# Patient Record
Sex: FEMALE | Race: WHITE | URBAN - METROPOLITAN AREA
[De-identification: names, ages, dates, MRNs, and addresses within clinical notes are randomized per-mention and may not be internally consistent; named-entity substitution may affect disease eponyms.]

---

## 2023-06-20 ENCOUNTER — OFFICE VISIT (OUTPATIENT)
Age: 23
End: 2023-06-20
Payer: COMMERCIAL

## 2023-06-20 VITALS
SYSTOLIC BLOOD PRESSURE: 120 MMHG | BODY MASS INDEX: 28.85 KG/M2 | RESPIRATION RATE: 17 BRPM | HEIGHT: 61 IN | OXYGEN SATURATION: 100 % | WEIGHT: 152.8 LBS | HEART RATE: 99 BPM | DIASTOLIC BLOOD PRESSURE: 80 MMHG

## 2023-06-20 DIAGNOSIS — T48.5X5A RHINITIS MEDICAMENTOSA: ICD-10-CM

## 2023-06-20 DIAGNOSIS — J34.3 HYPERTROPHY OF BOTH INFERIOR NASAL TURBINATES: ICD-10-CM

## 2023-06-20 DIAGNOSIS — R09.81 CHRONIC NASAL CONGESTION: Primary | ICD-10-CM

## 2023-06-20 DIAGNOSIS — J31.0 RHINITIS MEDICAMENTOSA: ICD-10-CM

## 2023-06-20 PROCEDURE — 99203 OFFICE O/P NEW LOW 30 MIN: CPT | Performed by: OTOLARYNGOLOGY

## 2023-06-20 RX ORDER — LEVONORGESTREL AND ETHINYL ESTRADIOL 0.1-0.02MG
1 KIT ORAL DAILY
COMMUNITY
Start: 2023-04-16

## 2023-06-20 RX ORDER — FLUTICASONE PROPIONATE 50 MCG
1 SPRAY, SUSPENSION (ML) NASAL DAILY
Qty: 32 G | Refills: 1 | Status: SHIPPED | OUTPATIENT
Start: 2023-06-20

## 2023-06-20 ASSESSMENT — ENCOUNTER SYMPTOMS
WHEEZING: 0
NAUSEA: 0
SINUS PRESSURE: 0
VOMITING: 0
VOICE CHANGE: 0
CHOKING: 0
PHOTOPHOBIA: 0
STRIDOR: 0
COUGH: 0
BACK PAIN: 0
APNEA: 0
SINUS PAIN: 0
TROUBLE SWALLOWING: 0
EYE ITCHING: 0
ABDOMINAL PAIN: 0
SHORTNESS OF BREATH: 0
SORE THROAT: 0
EYE DISCHARGE: 0

## 2023-06-20 NOTE — PROGRESS NOTES
Vanessa Rivas is a 20 y.o. female here for   Chief Complaint   Patient presents with    New Patient     Inflamed turbinates        There were no vitals taken for this visit.
Hematological:  Negative for adenopathy. Does not bruise/bleed easily. Psychiatric/Behavioral:  Negative for confusion and sleep disturbance. The patient is not nervous/anxious. No past medical history on file. No past surgical history on file. No family history on file. Social History     Tobacco Use    Smoking status: Not on file    Smokeless tobacco: Not on file   Substance Use Topics    Alcohol use: Not on file      Prior to Admission medications    Medication Sig Start Date End Date Taking? Authorizing Provider   [address] [place] [address] 0.1-20 MG-MCG per tablet Take 1 tablet by mouth daily 4/16/23  Yes Historical Provider, MD   Oxymetazoline HCl (VICKS SINEX 12 HOUR NA) by Nasal route   Yes Historical Provider, MD   fluticasone (FLONASE) 50 MCG/ACT nasal spray 1 spray by Each Nostril route daily 6/20/23  Yes Karine Mejia MD        Allergies   Allergen Reactions    Lidocaine      Bleeding at injection site    Tramadol Other (See Comments)     \"Bleeding\"    Dexamethasone Rash         Objective:     /80   Pulse 99   Resp 17   Ht 5' 1\" (1.549 m)   Wt 152 lb 12.8 oz (69.3 kg)   SpO2 100%   BMI 28.87 kg/m²      ENT Physical Exam  Constitutional  Appearance: patient appears well-developed, well-nourished and well-groomed, patient is cooperative;  Communication/Voice: communication appropriate for developmental age; vocal quality normal;  Head and Face  Appearance: head appears normal, face appears normal and face appears atraumatic;  Palpation: facial palpation normal;  Salivary: glands normal;  Ear  Hearing: intact;   Auricles: right auricle normal; left auricle normal;  External Mastoids: right external mastoid normal; left external mastoid normal;  Ear Canals: right ear canal normal; left ear canal normal;  Tympanic Membranes: right tympanic membrane normal; left tympanic membrane normal;  Nose  External Nose: nares patent bilaterally; external nose normal;  Internal Nose: bilateral intranasal mucosa

## 2023-06-28 ENCOUNTER — HOSPITAL ENCOUNTER (OUTPATIENT)
Facility: HOSPITAL | Age: 23
Discharge: HOME | End: 2023-07-01
Attending: OTOLARYNGOLOGY
Payer: COMMERCIAL

## 2023-06-28 DIAGNOSIS — R09.81 CHRONIC NASAL CONGESTION: ICD-10-CM

## 2023-06-28 DIAGNOSIS — J34.3 HYPERTROPHY OF BOTH INFERIOR NASAL TURBINATES: ICD-10-CM

## 2023-06-28 PROCEDURE — 70486 CT MAXILLOFACIAL W/O DYE: CPT

## 2023-07-06 ENCOUNTER — TELEPHONE (OUTPATIENT)
Age: 23
End: 2023-07-06

## 2023-08-22 ENCOUNTER — NURSE ONLY (OUTPATIENT)
Age: 23
End: 2023-08-22
Payer: COMMERCIAL

## 2023-08-22 VITALS — HEART RATE: 78 BPM | OXYGEN SATURATION: 99 % | DIASTOLIC BLOOD PRESSURE: 76 MMHG | SYSTOLIC BLOOD PRESSURE: 118 MMHG

## 2023-08-22 DIAGNOSIS — J30.89 ALLERGIC RHINITIS DUE TO OTHER ALLERGIC TRIGGER, UNSPECIFIED SEASONALITY: Primary | ICD-10-CM

## 2023-08-22 DIAGNOSIS — R09.81 CHRONIC NASAL CONGESTION: ICD-10-CM

## 2023-08-22 DIAGNOSIS — R09.81 CHRONIC NASAL CONGESTION: Primary | ICD-10-CM

## 2023-08-22 PROCEDURE — 95024 IQ TESTS W/ALLERGENIC XTRCS: CPT | Performed by: OTOLARYNGOLOGY

## 2023-08-22 PROCEDURE — 95004 PERQ TESTS W/ALRGNC XTRCS: CPT | Performed by: OTOLARYNGOLOGY

## 2023-08-25 ENCOUNTER — TELEMEDICINE (OUTPATIENT)
Age: 23
End: 2023-08-25
Payer: COMMERCIAL

## 2023-08-25 DIAGNOSIS — T48.5X5A RHINITIS MEDICAMENTOSA: ICD-10-CM

## 2023-08-25 DIAGNOSIS — J34.3 HYPERTROPHY OF BOTH INFERIOR NASAL TURBINATES: ICD-10-CM

## 2023-08-25 DIAGNOSIS — J31.0 RHINITIS MEDICAMENTOSA: ICD-10-CM

## 2023-08-25 DIAGNOSIS — J32.2 CHRONIC ETHMOIDAL SINUSITIS: ICD-10-CM

## 2023-08-25 DIAGNOSIS — R09.81 CHRONIC NASAL CONGESTION: Primary | ICD-10-CM

## 2023-08-25 PROCEDURE — 99213 OFFICE O/P EST LOW 20 MIN: CPT | Performed by: OTOLARYNGOLOGY

## 2023-08-25 ASSESSMENT — ENCOUNTER SYMPTOMS
SINUS PAIN: 0
SORE THROAT: 0
TROUBLE SWALLOWING: 0
PHOTOPHOBIA: 0
NAUSEA: 0
SINUS PRESSURE: 0
VOICE CHANGE: 0
CHOKING: 0
STRIDOR: 0
EYE DISCHARGE: 0
VOMITING: 0
APNEA: 0
EYE ITCHING: 0
ABDOMINAL PAIN: 0
COUGH: 0
BACK PAIN: 0
WHEEZING: 0
SHORTNESS OF BREATH: 0

## 2023-08-25 NOTE — H&P (VIEW-ONLY)
communication appropriate for developmental age; vocal quality normal;  Head and Face  Appearance: head appears normal, face appears normal and face appears atraumatic;  Ear  Hearing: intact; Auricles: right auricle normal; left auricle normal;  Nose  External Nose: external nose normal;  Oral Cavity/Oropharynx  Lips: normal;  Neck  Neck: neck normal;  Respiratory  Inspection: breathing unlabored; Auscultation: no stridor noted; Neurovestibular  Vestibular: no pathologic nystagmus; Mental Status: alert and oriented;  Psychiatric: mood normal; affect is appropriate;  Cranial Nerves: cranial nerves intact; Assessment/Plan:       ICD-10-CM    1. Chronic nasal congestion  R09.81       2. Rhinitis medicamentosa  J31.0     T48.5X5A       3. Chronic ethmoidal sinusitis  J32.2       4. Hypertrophy of both inferior nasal turbinates  J34.3         Patient with chronic nasal congestion using decongestant spray multiple times a daily. Rebound congestion likely contributing to symptoms as well. Plan to wean of decongestant spray by diluting with saline and decreasing daily use. Can temporarily use Claritin-D while weaning as patient allergic to oral steroids. Reviewed her allergy testing which came back negative. Her CT scan shows a midline septum, decongested turbinates but this may be secondary to nasal spray use. Right greater than left partial ethmoid edema. We discussed OR turbinate reduction, mini sinus surgery with maxillary antrostomy and anterior ethmoidectomy. Risks and benefits are discussed and she wants to proceed. Risks and benefits of endoscopic sinus surgery are discussed and handout is given. We discussed the risk of bleeding, infection, scar, continued sinus infections. Also the very remote risk of orbital or CSF complications. Questions are answered. No orders of the defined types were placed in this encounter. No follow-ups on file.        Thank you for referring this

## 2023-08-25 NOTE — PROGRESS NOTES
Subjective:    Carolynn Beyer   22 y.o.   2000     Followup Visit    History of Present Illness:  20yo female presents for inflamed turbinates. Had a procedure done a few years ago by Dr. Zachery Hercules for chronic congestion and it reportedly was complicated by significant bleeding, reaction to the lidocaine, and no improvement of symptoms. Has not seen ENT since. Congestion has been persistent since 2017, gets worse in the spring but is present year round. Has never done allergy testing. Denies sinus pressure or issues with sinus infections. Takes Vicks sinex spray 3 times daily, breath right patches occasionally. Has been on flonase in the past but this stopped working. Has loss of smell only when she doesn't use sprays. No issues with ears or sore throat, however does notice when she doesn't use nasal pray her ears feels more clogged. 8/25/2023-telemedicine visit    Pursuant to the emergency declaration under the [address] and the [employer] and [employer] Act, a synchronous virtual visit is being conducted with the patient's full consent, to reduce risk of exposure to COVID-19 and to provide indicated medical evaluation and treatment. Verbal consent is obtained for a virtual appointment, and patient is aware that insurance will be billed and patient is responsible for any copay or coinsurance. Follows up to discuss her allergy testing and preop. She is down to using the nasal spray 1-2 times daily and also using Flonase. Review of Systems  Review of Systems   Constitutional:  Negative for appetite change, chills, fatigue and fever. HENT:  Positive for congestion. Negative for ear discharge, ear pain, nosebleeds, postnasal drip, sinus pressure, sinus pain, sneezing, sore throat, tinnitus, trouble swallowing and voice change.     Eyes:  Negative for photophobia, discharge, itching and visual

## 2023-08-31 ENCOUNTER — TELEPHONE (OUTPATIENT)
Age: 23
End: 2023-08-31

## 2023-09-05 ENCOUNTER — HOSPITAL ENCOUNTER (OUTPATIENT)
Facility: HOSPITAL | Age: 23
Discharge: HOME | End: 2023-09-08

## 2023-09-05 VITALS
HEART RATE: 88 BPM | OXYGEN SATURATION: 99 % | HEIGHT: 61 IN | SYSTOLIC BLOOD PRESSURE: 129 MMHG | WEIGHT: 156.31 LBS | BODY MASS INDEX: 29.51 KG/M2 | DIASTOLIC BLOOD PRESSURE: 69 MMHG | RESPIRATION RATE: 20 BRPM | TEMPERATURE: 97.1 F

## 2023-09-05 RX ORDER — IBUPROFEN 200 MG
400 TABLET ORAL
COMMUNITY

## 2023-09-05 RX ORDER — PHENYLEPHRINE HCL 10 MG/1
10 TABLET, FILM COATED ORAL EVERY MORNING
COMMUNITY

## 2023-09-05 RX ORDER — LORATADINE AND PSEUDOEPHEDRINE SULFATE 5; 120 MG/1; MG/1
1 TABLET, EXTENDED RELEASE ORAL EVERY MORNING
COMMUNITY

## 2023-09-18 ENCOUNTER — ANESTHESIA EVENT (OUTPATIENT)
Facility: HOSPITAL | Age: 23
End: 2023-09-18
Payer: COMMERCIAL

## 2023-09-18 ENCOUNTER — TELEPHONE (OUTPATIENT)
Age: 23
End: 2023-09-18

## 2023-09-18 ENCOUNTER — ANESTHESIA (OUTPATIENT)
Facility: HOSPITAL | Age: 23
End: 2023-09-18
Payer: COMMERCIAL

## 2023-09-18 ENCOUNTER — HOSPITAL ENCOUNTER (OUTPATIENT)
Facility: HOSPITAL | Age: 23
Setting detail: OUTPATIENT SURGERY
Discharge: HOME | End: 2023-09-18
Attending: OTOLARYNGOLOGY | Admitting: OTOLARYNGOLOGY
Payer: COMMERCIAL

## 2023-09-18 VITALS
TEMPERATURE: 97.9 F | DIASTOLIC BLOOD PRESSURE: 76 MMHG | OXYGEN SATURATION: 100 % | SYSTOLIC BLOOD PRESSURE: 113 MMHG | HEART RATE: 74 BPM | HEIGHT: 61 IN | RESPIRATION RATE: 17 BRPM | BODY MASS INDEX: 29.64 KG/M2 | WEIGHT: 156.97 LBS

## 2023-09-18 DIAGNOSIS — J32.2 CHRONIC ETHMOIDAL SINUSITIS: Primary | ICD-10-CM

## 2023-09-18 LAB — HCG UR QL: NEGATIVE

## 2023-09-18 PROCEDURE — 31254 NSL/SINS NDSC W/PRTL ETHMDCT: CPT | Performed by: OTOLARYNGOLOGY

## 2023-09-18 PROCEDURE — 7100000001 HC PACU RECOVERY - ADDTL 15 MIN: Performed by: OTOLARYNGOLOGY

## 2023-09-18 PROCEDURE — 2500000003 HC RX 250 WO HCPCS: Performed by: NURSE ANESTHETIST, CERTIFIED REGISTERED

## 2023-09-18 PROCEDURE — 3700000001 HC ADD 15 MINUTES (ANESTHESIA): Performed by: OTOLARYNGOLOGY

## 2023-09-18 PROCEDURE — 2580000003 HC RX 258

## 2023-09-18 PROCEDURE — 3600000003 HC SURGERY LEVEL 3 BASE: Performed by: OTOLARYNGOLOGY

## 2023-09-18 PROCEDURE — 6360000002 HC RX W HCPCS: Performed by: OTOLARYNGOLOGY

## 2023-09-18 PROCEDURE — 2580000003 HC RX 258: Performed by: ANESTHESIOLOGY

## 2023-09-18 PROCEDURE — 2709999900 HC NON-CHARGEABLE SUPPLY: Performed by: OTOLARYNGOLOGY

## 2023-09-18 PROCEDURE — 6360000002 HC RX W HCPCS: Performed by: NURSE ANESTHETIST, CERTIFIED REGISTERED

## 2023-09-18 PROCEDURE — 3600000013 HC SURGERY LEVEL 3 ADDTL 15MIN: Performed by: OTOLARYNGOLOGY

## 2023-09-18 PROCEDURE — 7100000000 HC PACU RECOVERY - FIRST 15 MIN: Performed by: OTOLARYNGOLOGY

## 2023-09-18 PROCEDURE — 6370000000 HC RX 637 (ALT 250 FOR IP): Performed by: OTOLARYNGOLOGY

## 2023-09-18 PROCEDURE — 2720000010 HC SURG SUPPLY STERILE: Performed by: OTOLARYNGOLOGY

## 2023-09-18 PROCEDURE — 81025 URINE PREGNANCY TEST: CPT

## 2023-09-18 PROCEDURE — 2500000003 HC RX 250 WO HCPCS: Performed by: OTOLARYNGOLOGY

## 2023-09-18 PROCEDURE — 2580000003 HC RX 258: Performed by: OTOLARYNGOLOGY

## 2023-09-18 PROCEDURE — A4217 STERILE WATER/SALINE, 500 ML: HCPCS | Performed by: OTOLARYNGOLOGY

## 2023-09-18 PROCEDURE — 7100000010 HC PHASE II RECOVERY - FIRST 15 MIN: Performed by: OTOLARYNGOLOGY

## 2023-09-18 PROCEDURE — 30802 ABLATE INF TURBINATE SUBMUC: CPT | Performed by: OTOLARYNGOLOGY

## 2023-09-18 PROCEDURE — 3700000000 HC ANESTHESIA ATTENDED CARE: Performed by: OTOLARYNGOLOGY

## 2023-09-18 RX ORDER — ONDANSETRON 2 MG/ML
4 INJECTION INTRAMUSCULAR; INTRAVENOUS EVERY 6 HOURS PRN
Status: DISCONTINUED | OUTPATIENT
Start: 2023-09-18 | End: 2023-09-18 | Stop reason: HOSPADM

## 2023-09-18 RX ORDER — SODIUM CHLORIDE, SODIUM LACTATE, POTASSIUM CHLORIDE, CALCIUM CHLORIDE 600; 310; 30; 20 MG/100ML; MG/100ML; MG/100ML; MG/100ML
INJECTION, SOLUTION INTRAVENOUS CONTINUOUS
Status: CANCELLED | OUTPATIENT
Start: 2023-09-18

## 2023-09-18 RX ORDER — SODIUM CHLORIDE 0.9 % (FLUSH) 0.9 %
5-40 SYRINGE (ML) INJECTION EVERY 12 HOURS SCHEDULED
Status: DISCONTINUED | OUTPATIENT
Start: 2023-09-18 | End: 2023-09-18 | Stop reason: HOSPADM

## 2023-09-18 RX ORDER — DIPHENHYDRAMINE HYDROCHLORIDE 50 MG/ML
12.5 INJECTION INTRAMUSCULAR; INTRAVENOUS
Status: DISCONTINUED | OUTPATIENT
Start: 2023-09-18 | End: 2023-09-18 | Stop reason: HOSPADM

## 2023-09-18 RX ORDER — ONDANSETRON 2 MG/ML
4 INJECTION INTRAMUSCULAR; INTRAVENOUS
Status: DISCONTINUED | OUTPATIENT
Start: 2023-09-18 | End: 2023-09-18 | Stop reason: HOSPADM

## 2023-09-18 RX ORDER — LIDOCAINE HYDROCHLORIDE 10 MG/ML
1 INJECTION, SOLUTION EPIDURAL; INFILTRATION; INTRACAUDAL; PERINEURAL
Status: DISCONTINUED | OUTPATIENT
Start: 2023-09-18 | End: 2023-09-18 | Stop reason: HOSPADM

## 2023-09-18 RX ORDER — ACETAMINOPHEN 325 MG/1
650 TABLET ORAL EVERY 4 HOURS PRN
Status: DISCONTINUED | OUTPATIENT
Start: 2023-09-18 | End: 2023-09-18 | Stop reason: HOSPADM

## 2023-09-18 RX ORDER — DIPHENHYDRAMINE HYDROCHLORIDE 50 MG/ML
12.5 INJECTION INTRAMUSCULAR; INTRAVENOUS
Status: CANCELLED | OUTPATIENT
Start: 2023-09-18 | End: 2023-09-19

## 2023-09-18 RX ORDER — ESMOLOL HYDROCHLORIDE 10 MG/ML
INJECTION INTRAVENOUS PRN
Status: DISCONTINUED | OUTPATIENT
Start: 2023-09-18 | End: 2023-09-18 | Stop reason: SDUPTHER

## 2023-09-18 RX ORDER — EPINEPHRINE 1 MG/ML
INJECTION INTRAMUSCULAR; INTRAVENOUS; SUBCUTANEOUS PRN
Status: DISCONTINUED | OUTPATIENT
Start: 2023-09-18 | End: 2023-09-18 | Stop reason: HOSPADM

## 2023-09-18 RX ORDER — ONDANSETRON 4 MG/1
4 TABLET, ORALLY DISINTEGRATING ORAL EVERY 8 HOURS PRN
Status: DISCONTINUED | OUTPATIENT
Start: 2023-09-18 | End: 2023-09-18 | Stop reason: HOSPADM

## 2023-09-18 RX ORDER — FENTANYL CITRATE 50 UG/ML
100 INJECTION, SOLUTION INTRAMUSCULAR; INTRAVENOUS
Status: CANCELLED | OUTPATIENT
Start: 2023-09-18 | End: 2023-09-19

## 2023-09-18 RX ORDER — FENTANYL CITRATE 50 UG/ML
100 INJECTION, SOLUTION INTRAMUSCULAR; INTRAVENOUS
Status: DISCONTINUED | OUTPATIENT
Start: 2023-09-18 | End: 2023-09-18 | Stop reason: HOSPADM

## 2023-09-18 RX ORDER — OXYCODONE HYDROCHLORIDE 5 MG/1
10 TABLET ORAL EVERY 4 HOURS PRN
Status: DISCONTINUED | OUTPATIENT
Start: 2023-09-18 | End: 2023-09-18 | Stop reason: HOSPADM

## 2023-09-18 RX ORDER — OXYCODONE HYDROCHLORIDE 5 MG/1
5 TABLET ORAL EVERY 4 HOURS PRN
Status: DISCONTINUED | OUTPATIENT
Start: 2023-09-18 | End: 2023-09-18 | Stop reason: HOSPADM

## 2023-09-18 RX ORDER — AMOXICILLIN AND CLAVULANATE POTASSIUM 875; 125 MG/1; MG/1
1 TABLET, FILM COATED ORAL 2 TIMES DAILY
Qty: 20 TABLET | Refills: 0 | Status: SHIPPED | OUTPATIENT
Start: 2023-09-18 | End: 2023-09-28

## 2023-09-18 RX ORDER — ROCURONIUM BROMIDE 10 MG/ML
INJECTION, SOLUTION INTRAVENOUS PRN
Status: DISCONTINUED | OUTPATIENT
Start: 2023-09-18 | End: 2023-09-18 | Stop reason: SDUPTHER

## 2023-09-18 RX ORDER — LIDOCAINE HYDROCHLORIDE 10 MG/ML
1 INJECTION, SOLUTION EPIDURAL; INFILTRATION; INTRACAUDAL; PERINEURAL
Status: CANCELLED | OUTPATIENT
Start: 2023-09-18 | End: 2023-09-19

## 2023-09-18 RX ORDER — ONDANSETRON 2 MG/ML
4 INJECTION INTRAMUSCULAR; INTRAVENOUS
Status: CANCELLED | OUTPATIENT
Start: 2023-09-18 | End: 2023-09-19

## 2023-09-18 RX ORDER — DEXMEDETOMIDINE HYDROCHLORIDE 100 UG/ML
INJECTION, SOLUTION INTRAVENOUS PRN
Status: DISCONTINUED | OUTPATIENT
Start: 2023-09-18 | End: 2023-09-18 | Stop reason: SDUPTHER

## 2023-09-18 RX ORDER — LIDOCAINE HYDROCHLORIDE AND EPINEPHRINE BITARTRATE 20; .01 MG/ML; MG/ML
INJECTION, SOLUTION SUBCUTANEOUS PRN
Status: DISCONTINUED | OUTPATIENT
Start: 2023-09-18 | End: 2023-09-18 | Stop reason: HOSPADM

## 2023-09-18 RX ORDER — OXYMETAZOLINE HYDROCHLORIDE 0.05 G/100ML
SPRAY NASAL PRN
Status: DISCONTINUED | OUTPATIENT
Start: 2023-09-18 | End: 2023-09-18 | Stop reason: HOSPADM

## 2023-09-18 RX ORDER — SODIUM CHLORIDE 0.9 % (FLUSH) 0.9 %
5-40 SYRINGE (ML) INJECTION PRN
Status: DISCONTINUED | OUTPATIENT
Start: 2023-09-18 | End: 2023-09-18 | Stop reason: HOSPADM

## 2023-09-18 RX ORDER — ONDANSETRON 2 MG/ML
INJECTION INTRAMUSCULAR; INTRAVENOUS PRN
Status: DISCONTINUED | OUTPATIENT
Start: 2023-09-18 | End: 2023-09-18 | Stop reason: SDUPTHER

## 2023-09-18 RX ORDER — SODIUM CHLORIDE, SODIUM LACTATE, POTASSIUM CHLORIDE, CALCIUM CHLORIDE 600; 310; 30; 20 MG/100ML; MG/100ML; MG/100ML; MG/100ML
INJECTION, SOLUTION INTRAVENOUS CONTINUOUS PRN
Status: DISCONTINUED | OUTPATIENT
Start: 2023-09-18 | End: 2023-09-18 | Stop reason: SDUPTHER

## 2023-09-18 RX ORDER — HYDROMORPHONE HYDROCHLORIDE 2 MG/ML
INJECTION, SOLUTION INTRAMUSCULAR; INTRAVENOUS; SUBCUTANEOUS PRN
Status: DISCONTINUED | OUTPATIENT
Start: 2023-09-18 | End: 2023-09-18 | Stop reason: SDUPTHER

## 2023-09-18 RX ORDER — PHENYLEPHRINE HCL IN 0.9% NACL 0.4MG/10ML
SYRINGE (ML) INTRAVENOUS PRN
Status: DISCONTINUED | OUTPATIENT
Start: 2023-09-18 | End: 2023-09-18 | Stop reason: SDUPTHER

## 2023-09-18 RX ORDER — MIDAZOLAM HYDROCHLORIDE 2 MG/2ML
2 INJECTION, SOLUTION INTRAMUSCULAR; INTRAVENOUS
Status: CANCELLED | OUTPATIENT
Start: 2023-09-18 | End: 2023-09-19

## 2023-09-18 RX ORDER — PROPOFOL 10 MG/ML
INJECTION, EMULSION INTRAVENOUS PRN
Status: DISCONTINUED | OUTPATIENT
Start: 2023-09-18 | End: 2023-09-18 | Stop reason: SDUPTHER

## 2023-09-18 RX ORDER — SODIUM CHLORIDE, SODIUM LACTATE, POTASSIUM CHLORIDE, CALCIUM CHLORIDE 600; 310; 30; 20 MG/100ML; MG/100ML; MG/100ML; MG/100ML
INJECTION, SOLUTION INTRAVENOUS CONTINUOUS
Status: DISCONTINUED | OUTPATIENT
Start: 2023-09-18 | End: 2023-09-18 | Stop reason: HOSPADM

## 2023-09-18 RX ORDER — HYDROCODONE BITARTRATE AND ACETAMINOPHEN 5; 325 MG/1; MG/1
1 TABLET ORAL EVERY 4 HOURS PRN
Qty: 20 TABLET | Refills: 0 | Status: SHIPPED | OUTPATIENT
Start: 2023-09-18 | End: 2023-09-21

## 2023-09-18 RX ORDER — MIDAZOLAM HYDROCHLORIDE 2 MG/2ML
2 INJECTION, SOLUTION INTRAMUSCULAR; INTRAVENOUS
Status: DISCONTINUED | OUTPATIENT
Start: 2023-09-18 | End: 2023-09-18 | Stop reason: HOSPADM

## 2023-09-18 RX ORDER — SODIUM CHLORIDE 9 MG/ML
INJECTION, SOLUTION INTRAVENOUS PRN
Status: DISCONTINUED | OUTPATIENT
Start: 2023-09-18 | End: 2023-09-18 | Stop reason: HOSPADM

## 2023-09-18 RX ORDER — MIDAZOLAM HYDROCHLORIDE 1 MG/ML
INJECTION INTRAMUSCULAR; INTRAVENOUS PRN
Status: DISCONTINUED | OUTPATIENT
Start: 2023-09-18 | End: 2023-09-18 | Stop reason: SDUPTHER

## 2023-09-18 RX ADMIN — ONDANSETRON HYDROCHLORIDE 4 MG: 2 SOLUTION INTRAMUSCULAR; INTRAVENOUS at 08:49

## 2023-09-18 RX ADMIN — PROPOFOL 100 MCG/KG/MIN: 10 INJECTION, EMULSION INTRAVENOUS at 08:37

## 2023-09-18 RX ADMIN — DEXMEDETOMIDINE 20 MCG: 100 INJECTION, SOLUTION INTRAVENOUS at 08:41

## 2023-09-18 RX ADMIN — Medication 80 MCG: at 09:10

## 2023-09-18 RX ADMIN — SODIUM CHLORIDE, POTASSIUM CHLORIDE, SODIUM LACTATE AND CALCIUM CHLORIDE: 600; 310; 30; 20 INJECTION, SOLUTION INTRAVENOUS at 08:00

## 2023-09-18 RX ADMIN — Medication 40 MCG: at 09:06

## 2023-09-18 RX ADMIN — OXYCODONE HYDROCHLORIDE 10 MG: 5 TABLET ORAL at 10:12

## 2023-09-18 RX ADMIN — HYDROMORPHONE HYDROCHLORIDE 0.5 MG: 2 INJECTION, SOLUTION INTRAMUSCULAR; INTRAVENOUS; SUBCUTANEOUS at 08:15

## 2023-09-18 RX ADMIN — MIDAZOLAM HYDROCHLORIDE 2 MG: 1 INJECTION, SOLUTION INTRAMUSCULAR; INTRAVENOUS at 08:17

## 2023-09-18 RX ADMIN — Medication 120 MCG: at 09:21

## 2023-09-18 RX ADMIN — ROCURONIUM BROMIDE 50 MG: 10 INJECTION INTRAVENOUS at 08:25

## 2023-09-18 RX ADMIN — HYDROMORPHONE HYDROCHLORIDE 0.5 MG: 2 INJECTION, SOLUTION INTRAMUSCULAR; INTRAVENOUS; SUBCUTANEOUS at 08:20

## 2023-09-18 RX ADMIN — CEFAZOLIN SODIUM 2000 MG: 1 POWDER, FOR SOLUTION INTRAMUSCULAR; INTRAVENOUS at 08:32

## 2023-09-18 RX ADMIN — PROPOFOL 150 MG: 10 INJECTION, EMULSION INTRAVENOUS at 08:25

## 2023-09-18 RX ADMIN — SODIUM CHLORIDE, POTASSIUM CHLORIDE, SODIUM LACTATE AND CALCIUM CHLORIDE: 600; 310; 30; 20 INJECTION, SOLUTION INTRAVENOUS at 06:53

## 2023-09-18 RX ADMIN — Medication 80 MCG: at 09:16

## 2023-09-18 RX ADMIN — Medication 80 MCG: at 09:26

## 2023-09-18 RX ADMIN — ESMOLOL HYDROCHLORIDE 40 MG: 10 INJECTION, SOLUTION INTRAVENOUS at 08:29

## 2023-09-18 ASSESSMENT — PAIN DESCRIPTION - DESCRIPTORS: DESCRIPTORS: ACHING

## 2023-09-18 ASSESSMENT — PAIN SCALES - GENERAL: PAINLEVEL_OUTOF10: 8

## 2023-09-18 ASSESSMENT — PAIN DESCRIPTION - ONSET: ONSET: ON-GOING

## 2023-09-18 ASSESSMENT — PAIN DESCRIPTION - PAIN TYPE: TYPE: ACUTE PAIN

## 2023-09-18 ASSESSMENT — PAIN DESCRIPTION - FREQUENCY: FREQUENCY: CONTINUOUS

## 2023-09-18 ASSESSMENT — PAIN - FUNCTIONAL ASSESSMENT
PAIN_FUNCTIONAL_ASSESSMENT: NONE - DENIES PAIN
PAIN_FUNCTIONAL_ASSESSMENT: ACTIVITIES ARE NOT PREVENTED

## 2023-09-18 ASSESSMENT — PAIN DESCRIPTION - LOCATION: LOCATION: HEAD

## 2023-09-18 ASSESSMENT — PAIN DESCRIPTION - ORIENTATION: ORIENTATION: INNER

## 2023-09-18 NOTE — INTERVAL H&P NOTE
Update History & Physical    H&P update    Patient examined at the bedside preoperatively. Heart -regular rate and rhythm, S1/S2  Lungs -clear to auscultation bilaterally    No other changes to prior H&P. Procedure again discussed in detail, risks and benefits explained, postoperative considerations discussed. All questions answered.       Electronically signed by Juan A Lackey MD on 9/18/2023 at 8:10 AM

## 2023-09-18 NOTE — ANESTHESIA POSTPROCEDURE EVALUATION
Department of Anesthesiology  Postprocedure Note    Patient: Darlene Hopkins  MRN: [id number]  YOB: 2000  Date of evaluation: 9/18/2023      Procedure Summary     Date: 09/18/23 Room / Location: [facility] ASU OR  / [facility] AMBULATORY OR    Anesthesia Start: 0819 Anesthesia Stop: 0951    Procedure: BILATERAL SUBMUCOUS ABLATION OF INFERIOR TURBINATES, BILATERAL ENDOSCOPIC   ANTERIOR ETHMOIDECTOMIES (Bilateral: Nose) Diagnosis:       Chronic nasal congestion      Rhinitis medicamentosa      Hypertrophy of inferior nasal turbinate      (Chronic nasal congestion [R09.81])      (Rhinitis medicamentosa [J31.0, T48.5X5A])      (Hypertrophy of inferior nasal turbinate [J34.3])    Surgeons: Naun Guy MD Responsible Provider: Kriss Benitez MD    Anesthesia Type: General ASA Status: 2          Anesthesia Type: General    Luiza Phase I: Luiza Score: 8    Luiza Phase II:        Anesthesia Post Evaluation    Patient location during evaluation: PACU  Level of consciousness: awake and alert  Pain score: 1  Airway patency: patent  Nausea & Vomiting: no vomiting and no nausea  Complications: no  Cardiovascular status: hemodynamically stable  Respiratory status: room air  Hydration status: stable

## 2023-09-18 NOTE — TELEPHONE ENCOUNTER
Spoke with the patient's mother stating that the patient is not feeling well. She is bleeding quite a bit, approx. 2-3 tbs with clots. In the bathroom sick and shaking. Sent message to Dr. Lucy Vera.  Also spoke with Dr. Nelson Juarez. She advised using ice pack, continue with the saline, use Afrin and lean forward as to not swallow the blood. Patient mother was advised. Dr. Lucy Vera responded and is going to call the patient.

## 2023-09-18 NOTE — OP NOTE
Operative Note    Patient: Joseph Miranda  YOB: 2000  MRN: [id number]    Date of Procedure: 9/18/23     Pre-Op Diagnosis: Chronic nasal congestion [R09.81]  Rhinitis medicamentosa [J31.0, T48.5X5A]  Hypertrophy of inferior nasal turbinate [J34.3]    Post-Op Diagnosis: Same as preoperative diagnosis. Procedure(s):  BILATERAL SUBMUCOUS ABLATION OF INFERIOR TURBINATES, BILATERAL ENDOSCOPIC   ANTERIOR ETHMOIDECTOMIES    Surgeon(s):  Merrill Lewis MD    Surgical Assistant: None    Anesthesia: General     Estimated Blood Loss (mL):  less than 098     Complications: None    Specimens: * No specimens in log *     Implants: * No implants in log *    Drains: * No LDAs found *    Findings: Edematous and congested inferior turbinate and middle turbinate mucosa. Edematous mucosa within the anterior ethmoidal cavities    Indications: 23-year-old female presents with chronic nasal congestion and obstruction. She has had relatively minor findings on allergy testing. She has tried multiple medical regimens. She is chronically dependent upon nasal decongestant sprays. She had prior office-based turbinate reduction procedure which was unhelpful. CT scan revealed turbinate hypertrophy and mucosal thickening within the ethmoidal cavities. She brought to the operating room for endoscopic treatment. Detailed Description of Procedure:     Patient was brought to the operating room placed supine on the table. General endotracheal anesthesia was obtained and a timeout was performed. Patient was positioned and draped in the appropriate fashion for nasal surgery. We began first with 0 degree endoscope and the nose was packed bilaterally with oxymetazoline pledgets. After several minutes these were removed and a total of 4 mL of 2% lidocaine with 1-100,000 parts epinephrine was infiltrated into inferior turbinate mucosa bilaterally. Oxymetazoline pledgets are replaced.     After several minutes I

## 2023-09-26 ENCOUNTER — OFFICE VISIT (OUTPATIENT)
Age: 23
End: 2023-09-26

## 2023-09-26 VITALS — HEIGHT: 61 IN | BODY MASS INDEX: 29.66 KG/M2

## 2023-09-26 DIAGNOSIS — J32.2 CHRONIC ETHMOIDAL SINUSITIS: ICD-10-CM

## 2023-09-26 DIAGNOSIS — J34.3 HYPERTROPHY OF BOTH INFERIOR NASAL TURBINATES: ICD-10-CM

## 2023-09-26 DIAGNOSIS — R09.81 CHRONIC NASAL CONGESTION: Primary | ICD-10-CM

## 2023-09-26 PROCEDURE — 99024 POSTOP FOLLOW-UP VISIT: CPT | Performed by: OTOLARYNGOLOGY

## 2023-09-26 ASSESSMENT — ENCOUNTER SYMPTOMS
SORE THROAT: 0
BACK PAIN: 0
WHEEZING: 0
VOMITING: 0
SINUS PAIN: 0
TROUBLE SWALLOWING: 0
EYE ITCHING: 0
CHOKING: 0
VOICE CHANGE: 0
APNEA: 0
COUGH: 0
PHOTOPHOBIA: 0
SHORTNESS OF BREATH: 0
SINUS PRESSURE: 0
EYE DISCHARGE: 0
ABDOMINAL PAIN: 0
STRIDOR: 0
NAUSEA: 0

## 2023-09-26 NOTE — PROGRESS NOTES
Subjective:    Dulce Watters   22 y.o.   2000     Followup Visit    History of Present Illness:  20yo female presents for inflamed turbinates. Had a procedure done a few years ago by Dr. Bryan Hickey for chronic congestion and it reportedly was complicated by significant bleeding, reaction to the lidocaine, and no improvement of symptoms. Has not seen ENT since. Congestion has been persistent since 2017, gets worse in the spring but is present year round. Has never done allergy testing. Denies sinus pressure or issues with sinus infections. Takes Vicks sinex spray 3 times daily, breath right patches occasionally. Has been on flonase in the past but this stopped working. Has loss of smell only when she doesn't use sprays. No issues with ears or sore throat, however does notice when she doesn't use nasal pray her ears feels more clogged. 8/25/2023-telemedicine visit    Pursuant to the emergency declaration under the [address] and the [employer] and [employer] Act, a synchronous virtual visit is being conducted with the patient's full consent, to reduce risk of exposure to COVID-19 and to provide indicated medical evaluation and treatment. Verbal consent is obtained for a virtual appointment, and patient is aware that insurance will be billed and patient is responsible for any copay or coinsurance. Follows up to discuss her allergy testing and preop. She is down to using the nasal spray 1-2 times daily and also using Flonase. 9/26/23  1 week postop mini FESS and turbinate Coblation  Better now, less bleeding only old blood - not using Sinex    Review of Systems  Review of Systems   Constitutional:  Negative for appetite change, chills, fatigue and fever. HENT:  Positive for congestion.  Negative for ear discharge, ear pain, nosebleeds, postnasal drip, sinus pressure, sinus pain, sneezing, sore

## 2023-10-20 ENCOUNTER — OFFICE VISIT (OUTPATIENT)
Age: 23
End: 2023-10-20

## 2023-10-20 VITALS
BODY MASS INDEX: 28.32 KG/M2 | HEIGHT: 61 IN | DIASTOLIC BLOOD PRESSURE: 86 MMHG | SYSTOLIC BLOOD PRESSURE: 122 MMHG | WEIGHT: 150 LBS

## 2023-10-20 DIAGNOSIS — J32.2 CHRONIC ETHMOIDAL SINUSITIS: ICD-10-CM

## 2023-10-20 DIAGNOSIS — J34.3 HYPERTROPHY OF BOTH INFERIOR NASAL TURBINATES: ICD-10-CM

## 2023-10-20 DIAGNOSIS — R09.81 CHRONIC NASAL CONGESTION: Primary | ICD-10-CM

## 2023-10-20 PROCEDURE — 99024 POSTOP FOLLOW-UP VISIT: CPT | Performed by: OTOLARYNGOLOGY

## (undated) DEVICE — MINOR ENT-SFMCASU: Brand: MEDLINE INDUSTRIES, INC.

## (undated) DEVICE — SOLUTION IRRIG 1000ML 0.9% SOD CHL USP POUR PLAS BTL

## (undated) DEVICE — GLOVE ORANGE PI 7 1/2   MSG9075

## (undated) DEVICE — K-Y LUBRICANT JELLY 4OZ

## (undated) DEVICE — REFLEX ULTRA 45 WITH INTEGRATED CABLE: Brand: COBLATION

## (undated) DEVICE — SOLUTION IV 500ML 0.9% SOD CHL PH 5 INJ USP VIAFLX PLAS

## (undated) DEVICE — KIT,ANTI FOG,W/SPONGE & FLUID,SOFT PACK: Brand: MEDLINE

## (undated) DEVICE — CODMAN® SURGICAL PATTIES 1/2" X 3" (1.27CM X 7.62CM): Brand: CODMAN®

## (undated) DEVICE — BLADE 1884004 TRICUT 5PK 4MM: Brand: TRICUT®

## (undated) DEVICE — FIRM 4CM: Brand: NASOPORE

## (undated) DEVICE — CANISTER, RIGID, 3000CC: Brand: MEDLINE INDUSTRIES, INC.